# Patient Record
Sex: FEMALE | Race: WHITE | NOT HISPANIC OR LATINO | Employment: STUDENT | URBAN - METROPOLITAN AREA
[De-identification: names, ages, dates, MRNs, and addresses within clinical notes are randomized per-mention and may not be internally consistent; named-entity substitution may affect disease eponyms.]

---

## 2021-06-07 ENCOUNTER — TELEPHONE (OUTPATIENT)
Dept: RHEUMATOLOGY | Facility: CLINIC | Age: 21
End: 2021-06-07

## 2021-06-08 ENCOUNTER — OFFICE VISIT (OUTPATIENT)
Dept: RHEUMATOLOGY | Facility: CLINIC | Age: 21
End: 2021-06-08
Payer: COMMERCIAL

## 2021-06-08 ENCOUNTER — LAB VISIT (OUTPATIENT)
Dept: LAB | Facility: HOSPITAL | Age: 21
End: 2021-06-08
Attending: INTERNAL MEDICINE
Payer: COMMERCIAL

## 2021-06-08 VITALS
DIASTOLIC BLOOD PRESSURE: 74 MMHG | BODY MASS INDEX: 21.57 KG/M2 | WEIGHT: 125.69 LBS | HEART RATE: 70 BPM | SYSTOLIC BLOOD PRESSURE: 115 MMHG

## 2021-06-08 DIAGNOSIS — Z71.89 COUNSELING ON HEALTH PROMOTION AND DISEASE PREVENTION: ICD-10-CM

## 2021-06-08 DIAGNOSIS — M25.50 ARTHRALGIA, UNSPECIFIED JOINT: ICD-10-CM

## 2021-06-08 DIAGNOSIS — M25.50 ARTHRALGIA, UNSPECIFIED JOINT: Primary | ICD-10-CM

## 2021-06-08 DIAGNOSIS — Z84.0 FAMILY HISTORY OF LUPUS ERYTHEMATOSUS: ICD-10-CM

## 2021-06-08 DIAGNOSIS — Z83.2 FAMILY HISTORY OF AUTOIMMUNE DISORDER: ICD-10-CM

## 2021-06-08 DIAGNOSIS — M79.10 MYALGIA: ICD-10-CM

## 2021-06-08 PROCEDURE — 86703 HIV-1/HIV-2 1 RESULT ANTBDY: CPT | Performed by: INTERNAL MEDICINE

## 2021-06-08 PROCEDURE — 99999 PR PBB SHADOW E&M-EST. PATIENT-LVL III: CPT | Mod: PBBFAC,,, | Performed by: INTERNAL MEDICINE

## 2021-06-08 PROCEDURE — 80074 ACUTE HEPATITIS PANEL: CPT | Performed by: INTERNAL MEDICINE

## 2021-06-08 PROCEDURE — 99999 PR PBB SHADOW E&M-EST. PATIENT-LVL III: ICD-10-PCS | Mod: PBBFAC,,, | Performed by: INTERNAL MEDICINE

## 2021-06-08 PROCEDURE — 86038 ANTINUCLEAR ANTIBODIES: CPT | Performed by: INTERNAL MEDICINE

## 2021-06-08 PROCEDURE — 86200 CCP ANTIBODY: CPT | Performed by: INTERNAL MEDICINE

## 2021-06-08 PROCEDURE — 3008F BODY MASS INDEX DOCD: CPT | Mod: CPTII,S$GLB,, | Performed by: INTERNAL MEDICINE

## 2021-06-08 PROCEDURE — 36415 COLL VENOUS BLD VENIPUNCTURE: CPT | Mod: PO | Performed by: INTERNAL MEDICINE

## 2021-06-08 PROCEDURE — 1126F AMNT PAIN NOTED NONE PRSNT: CPT | Mod: S$GLB,,, | Performed by: INTERNAL MEDICINE

## 2021-06-08 PROCEDURE — 99205 OFFICE O/P NEW HI 60 MIN: CPT | Mod: S$GLB,,, | Performed by: INTERNAL MEDICINE

## 2021-06-08 PROCEDURE — 3008F PR BODY MASS INDEX (BMI) DOCUMENTED: ICD-10-PCS | Mod: CPTII,S$GLB,, | Performed by: INTERNAL MEDICINE

## 2021-06-08 PROCEDURE — 99205 PR OFFICE/OUTPT VISIT, NEW, LEVL V, 60-74 MIN: ICD-10-PCS | Mod: S$GLB,,, | Performed by: INTERNAL MEDICINE

## 2021-06-08 PROCEDURE — 1126F PR PAIN SEVERITY QUANTIFIED, NO PAIN PRESENT: ICD-10-PCS | Mod: S$GLB,,, | Performed by: INTERNAL MEDICINE

## 2021-06-08 RX ORDER — NABUMETONE 500 MG/1
500 TABLET, FILM COATED ORAL 2 TIMES DAILY
Qty: 60 TABLET | Refills: 0 | Status: SHIPPED | OUTPATIENT
Start: 2021-06-08 | End: 2021-06-22

## 2021-06-09 ENCOUNTER — PATIENT MESSAGE (OUTPATIENT)
Dept: RHEUMATOLOGY | Facility: CLINIC | Age: 21
End: 2021-06-09

## 2021-06-09 LAB
ANA SER QL IF: NORMAL
CCP AB SER IA-ACNC: <0.5 U/ML
HAV IGM SERPL QL IA: NEGATIVE
HBV CORE IGM SERPL QL IA: NEGATIVE
HBV SURFACE AG SERPL QL IA: NEGATIVE
HCV AB SERPL QL IA: NEGATIVE
HIV 1+2 AB+HIV1 P24 AG SERPL QL IA: NEGATIVE

## 2021-06-15 ENCOUNTER — TELEPHONE (OUTPATIENT)
Dept: RHEUMATOLOGY | Facility: CLINIC | Age: 21
End: 2021-06-15

## 2021-06-15 DIAGNOSIS — R21 RASH AND NONSPECIFIC SKIN ERUPTION: Primary | ICD-10-CM

## 2022-05-01 ENCOUNTER — NURSE TRIAGE (OUTPATIENT)
Dept: ADMINISTRATIVE | Facility: CLINIC | Age: 22
End: 2022-05-01
Payer: COMMERCIAL

## 2022-05-01 ENCOUNTER — PATIENT MESSAGE (OUTPATIENT)
Dept: RHEUMATOLOGY | Facility: CLINIC | Age: 22
End: 2022-05-01
Payer: COMMERCIAL

## 2022-05-01 NOTE — TELEPHONE ENCOUNTER
Reason for Disposition   [1] Very swollen joint AND [2] no fever    Additional Information   Negative: Sounds like a life-threatening emergency to the triager   Negative: Followed a knee injury   Negative: Leg pain is main symptom   Negative: Knee swelling is main symptom   Negative: [1] Swollen joint AND [2] fever   Negative: [1] Red area or streak AND [2] fever   Negative: Patient sounds very sick or weak to the triager   Negative: [1] SEVERE pain (e.g., excruciating, unable to walk) AND [2] not improved after 2 hours of pain medicine   Negative: [1] Can't move swollen joint at all AND [2] no fever   Negative: [1] Thigh or calf pain AND [2] only 1 side AND [3] present > 1 hour   Negative: [1] Thigh, calf, or ankle swelling AND [2] only 1 side   Negative: [1] Looks infected (spreading redness, pus) AND [2] large red area (> 2 in. or 5 cm)    Protocols used: KNEE PAIN-A-    Pt is on the phone with her Mother. Pt's mother stated the pt has joint pain and rashes on her elbows that comes and goes for two years. Stated Dr. Monroe could not determine the problem the last time he saw her.    Pt's mother stated she needs a referral to a rheumatologist immediately in [facility] while the pt is having the attack.     Stated she saw Dr. Monroe a year ago. Per triage protocol, pt advised to see a MD in 24 hrs or go to Urgent Care today, and a message will be sent to Dr. Monroe for follow up when the office opens.     Pt's mother adamantly refused the disposition and demanded to speak with the Provider on call for a referral today. Call placed to krupa Leavitt transferred the call to Pt's mother.

## 2022-05-02 DIAGNOSIS — M25.50 ARTHRALGIA, UNSPECIFIED JOINT: Primary | ICD-10-CM

## 2022-05-02 DIAGNOSIS — Z83.2 FAMILY HISTORY OF AUTOIMMUNE DISORDER: ICD-10-CM

## 2022-05-02 RX ORDER — METHYLPREDNISOLONE 4 MG/1
TABLET ORAL
Qty: 1 EACH | Refills: 0 | Status: SHIPPED | OUTPATIENT
Start: 2022-05-02 | End: 2024-02-26 | Stop reason: ALTCHOICE

## 2022-05-02 NOTE — PROGRESS NOTES
I spoke to the patient.  I would like her to get CBC, CMP, sed rate, CRP.  I will ask staff to help coordinate that with her.  She lives and rest in.  Obviously they can have that done in the area and get the labs faxed to us.  Also, I would like her to see Dermatology for the rash on the right elbow.  She may benefit from having the rash biopsied.  Patient will be moving to [place] in the next 2 months.  She is requesting referral for Rheumatology.  She will touch base with insurance to find a rheumatologist in the area.  Once she provides that we will certainly send the fax over so she can schedule as soon as possible.  In the meantime I put her on a Medrol Dosepak.  She will hold the nabumetone while taking the Medrol Dosepak.  She verbalized understanding and agreed with the above.

## 2022-05-03 ENCOUNTER — TELEPHONE (OUTPATIENT)
Dept: RHEUMATOLOGY | Facility: CLINIC | Age: 22
End: 2022-05-03
Payer: COMMERCIAL

## 2022-05-03 DIAGNOSIS — Z83.2 FAMILY HISTORY OF AUTOIMMUNE DISORDER: ICD-10-CM

## 2022-05-03 DIAGNOSIS — M25.50 ARTHRALGIA, UNSPECIFIED JOINT: Primary | ICD-10-CM

## 2022-05-03 DIAGNOSIS — R21 RASH AND NONSPECIFIC SKIN ERUPTION: ICD-10-CM

## 2022-05-03 NOTE — TELEPHONE ENCOUNTER
----- Message from Ana Yusuf PA-C sent at 5/3/2022  8:34 AM CDT -----  Hey, I talked to Dr. Monroe yesterday and he provided some guidance.  She needs BW (can get arranged today locally unless we have a lab nearby).  And I sent in a steroid for her.   She said she'd call insurance to let us know where to send the referral.    ----- Message -----  From: Clifford Brown LPN  Sent: 5/3/2022   8:30 AM CDT  To: Ana Yusuf PA-C    Does she need a virtual visit with us or someone in [facility]?   ----- Message -----  From: Ana Yusuf PA-C  Sent: 5/2/2022  10:54 AM CDT  To: NATALI Blevins  Got this call on Sunday about Ambyr.  Meant to cc  you but apparently did not.  Can you get her a VV w someone this week?  Fer saw her and basically said he would need to see her during a flare.  Even with a ref to someone in [facility], I doubt she will get in this week.    ----- Message -----  From: Ana Yusuf PA-C  Sent: 5/1/2022   8:27 AM CDT  To: Charlie Monroe MD, Ana Yusuf PA-C    Dull achy joint pain and rash on the backs of the elbows.  Started last day or two. Nabumetone twice daily.  You had mentioned needing her to be seen when she was having an attach. Now living in [facility] and looking for referral to someone there.     I think shed benefit from at least getting a VV w one of us now that she is symptomatic. She will also send you a pic of the rash. Clifford, can you help get her scheduled?  Do you have anyone in mind in the [facility] area for referral?

## 2022-05-04 ENCOUNTER — TELEPHONE (OUTPATIENT)
Dept: RHEUMATOLOGY | Facility: CLINIC | Age: 22
End: 2022-05-04
Payer: COMMERCIAL

## 2022-05-04 NOTE — TELEPHONE ENCOUNTER
----- Message from Leigh Ann Villafuerte LPN sent at 5/3/2022  4:51 PM CDT -----  Contact: pt    ----- Message -----  From: Clinton Laird  Sent: 5/3/2022   8:23 AM CDT  To: Fer Guerra Staff    Pt is calling rg following up on phone call from yesterday. Has some questions rg the labs she is to have done and can be reached at [phone] and pls call anytime after 12 //thanks/dbw

## 2022-05-04 NOTE — TELEPHONE ENCOUNTER
"Returned patients phone call. All questions answered.      Diane Lamar" QUIN Matos  Rheumatology Department   "

## 2022-05-06 DIAGNOSIS — R21 RASH AND NONSPECIFIC SKIN ERUPTION: Primary | ICD-10-CM

## 2022-05-09 ENCOUNTER — PATIENT MESSAGE (OUTPATIENT)
Dept: RHEUMATOLOGY | Facility: CLINIC | Age: 22
End: 2022-05-09
Payer: COMMERCIAL

## 2022-05-10 ENCOUNTER — TELEPHONE (OUTPATIENT)
Dept: RHEUMATOLOGY | Facility: CLINIC | Age: 22
End: 2022-05-10
Payer: COMMERCIAL

## 2022-05-10 NOTE — TELEPHONE ENCOUNTER
----- Message from Dieter Johnson sent at 5/10/2022 11:48 AM CDT -----  Contact: PT  Type:  Patient Returning Call    Who Called:PT  Who Left Message for Patient:[place]   Does the patient know what this is regarding?: Faxes  Would the patient rather a call back or a response via [facility]? Call back   Best Call Back Number: [phone]  Additional Information: Might not be able to answer. Got a call Friday about her results

## 2022-05-10 NOTE — TELEPHONE ENCOUNTER
Patient  Is requesting her labs be scanned in chart so see can view them . Patient states that she was talking with Ms Yusuf's nurse on Friday 5-6 and labs were being reviewed

## 2022-05-10 NOTE — TELEPHONE ENCOUNTER
----- Message from Leigh Ann Villafuerte LPN sent at 5/10/2022  8:19 AM CDT -----  Regarding: do you receive labs for this patient?  Patient looking for faxed labs.    -Dulce

## 2022-05-12 ENCOUNTER — TELEPHONE (OUTPATIENT)
Dept: OBSTETRICS AND GYNECOLOGY | Facility: CLINIC | Age: 22
End: 2022-05-12
Payer: COMMERCIAL

## 2022-05-12 ENCOUNTER — TELEPHONE (OUTPATIENT)
Dept: RHEUMATOLOGY | Facility: CLINIC | Age: 22
End: 2022-05-12
Payer: COMMERCIAL

## 2022-05-12 NOTE — TELEPHONE ENCOUNTER
----- Message from Maria Isabel Chairez sent at 5/11/2022  4:17 PM CDT -----  Pt's mother (Joy) would like the nurse to call her back please. Call back number is .[phone]. Thx. EL

## 2022-05-12 NOTE — TELEPHONE ENCOUNTER
Spoke with mother and they were calling to get the test results (scanned under media) and states that pt saw a dermatologist today and they told her that she has psoriatic arthritis. Advised mother that we would send a message over to the provider regarding the results and once they let us know we will give them a call back. Verbalized understanding

## 2022-05-12 NOTE — TELEPHONE ENCOUNTER
----- Message from Alejandro Valdez sent at 5/12/2022  3:47 PM CDT -----  Contact: Alicia Vargas (mom) would like to consult with nurse regarding derm results.  Please contact  Alicia @ [phone].  Thanks/As

## 2022-05-12 NOTE — TELEPHONE ENCOUNTER
AUTUMN Hernandez, NATALI; Charlie Monroe MD  Caller: Unspecified (Today,  3:56 PM)  Dr. Monroe,   This girl saw you last year for arthralgias.  Saw derm and now has dx  of Psoriasis.  I would assume you would like her to have a visit w you or one of us to discuss treatment moving forward?   Clifford   You can let her know the labs are fine.  But b/f anyone gives her medication for PsA, she needs an appt.  When they called while I was on call, she told me she was moving and wanted to see rheum where she was moving.  Brandin if the new derm dx changed her mind?  But if so, schedule appt w 1st available, unless Dr. Monroe says otherwise.   Destiny

## 2022-05-13 NOTE — TELEPHONE ENCOUNTER
"Are we able to make external lab results visible to the patient?    Diane Lamar" QUIN Matos  Rheumatology Department  "

## 2022-05-14 ENCOUNTER — PATIENT MESSAGE (OUTPATIENT)
Dept: RHEUMATOLOGY | Facility: CLINIC | Age: 22
End: 2022-05-14
Payer: COMMERCIAL

## 2022-05-16 ENCOUNTER — PATIENT MESSAGE (OUTPATIENT)
Dept: RHEUMATOLOGY | Facility: CLINIC | Age: 22
End: 2022-05-16
Payer: COMMERCIAL

## 2024-02-26 PROBLEM — Z00.00 ANNUAL PHYSICAL EXAM: Status: ACTIVE | Noted: 2024-02-26

## 2024-02-26 PROBLEM — E55.9 VITAMIN D DEFICIENCY: Status: ACTIVE | Noted: 2024-02-26

## 2024-02-26 PROBLEM — F33.41 RECURRENT MAJOR DEPRESSIVE DISORDER, IN PARTIAL REMISSION: Status: ACTIVE | Noted: 2024-02-26

## 2024-02-26 PROBLEM — F41.9 ANXIETY AND DEPRESSION: Status: ACTIVE | Noted: 2024-02-26

## 2024-02-26 PROBLEM — F32.A ANXIETY AND DEPRESSION: Status: ACTIVE | Noted: 2024-02-26

## 2024-05-27 PROBLEM — Z00.00 ANNUAL PHYSICAL EXAM: Status: RESOLVED | Noted: 2024-02-26 | Resolved: 2024-05-27

## 2025-06-09 ENCOUNTER — OFFICE VISIT (OUTPATIENT)
Dept: GASTROENTEROLOGY | Facility: CLINIC | Age: 25
End: 2025-06-09
Payer: COMMERCIAL

## 2025-06-09 VITALS — HEIGHT: 65 IN | BODY MASS INDEX: 19.16 KG/M2 | WEIGHT: 115 LBS

## 2025-06-09 DIAGNOSIS — Z86.59 HISTORY OF BULIMIA NERVOSA: ICD-10-CM

## 2025-06-09 DIAGNOSIS — K63.9 THICKENED SMALL BOWEL: ICD-10-CM

## 2025-06-09 DIAGNOSIS — R10.84 GENERALIZED ABDOMINAL PAIN: Primary | ICD-10-CM

## 2025-06-09 DIAGNOSIS — R19.8 ALTERNATING CONSTIPATION AND DIARRHEA: ICD-10-CM

## 2025-06-09 DIAGNOSIS — K21.9 GASTROESOPHAGEAL REFLUX DISEASE WITHOUT ESOPHAGITIS: ICD-10-CM

## 2025-06-09 DIAGNOSIS — R19.8 IRREGULAR BOWEL HABITS: ICD-10-CM

## 2025-06-09 DIAGNOSIS — R93.3 ABNORMAL CT SCAN, ESOPHAGUS: ICD-10-CM

## 2025-06-09 PROCEDURE — 1159F MED LIST DOCD IN RCRD: CPT | Mod: CPTII,S$GLB,, | Performed by: NURSE PRACTITIONER

## 2025-06-09 PROCEDURE — 3008F BODY MASS INDEX DOCD: CPT | Mod: CPTII,S$GLB,, | Performed by: NURSE PRACTITIONER

## 2025-06-09 PROCEDURE — 99999 PR PBB SHADOW E&M-EST. PATIENT-LVL IV: CPT | Mod: PBBFAC,,, | Performed by: NURSE PRACTITIONER

## 2025-06-09 PROCEDURE — 1160F RVW MEDS BY RX/DR IN RCRD: CPT | Mod: CPTII,S$GLB,, | Performed by: NURSE PRACTITIONER

## 2025-06-09 PROCEDURE — 99204 OFFICE O/P NEW MOD 45 MIN: CPT | Mod: S$GLB,,, | Performed by: NURSE PRACTITIONER

## 2025-06-09 RX ORDER — OMEPRAZOLE 40 MG/1
40 CAPSULE, DELAYED RELEASE ORAL DAILY
Qty: 30 CAPSULE | Refills: 2 | Status: SHIPPED | OUTPATIENT
Start: 2025-06-09 | End: 2025-09-07

## 2025-06-09 RX ORDER — MULTIVITAMIN
1 TABLET ORAL EVERY MORNING
COMMUNITY

## 2025-06-09 RX ORDER — B-COMPLEX WITH VITAMIN C
1 TABLET ORAL EVERY MORNING
COMMUNITY

## 2025-06-09 NOTE — PROGRESS NOTES
"Subjective:       Patient ID: Rocio Booker is a 25 y.o. female, Body mass index is 19.14 kg/m².    Chief Complaint: Establish Care      Patient is new to me. Referred by Siva Suero PA-C for epigastric pain and enteritis.    Reviewed ED discharge summary from 6/2/25: "Hx/PE/MDM:  Patient is a 25-year-old female with a PMH of anxiety, depression, parotitis presenting to the ED today due to concern for myalgias, fever and epigastric abdominal pain.     On exam, patient has epigastric abdominal tenderness to palpation.  No rebound or guarding.  No signs of meningismus.  Patient ambulatory without instability.  Patient febrile upon arrival.     Blood work notable for magnesium 1.5, sodium 130, potassium 2.9.  CT concerning for possible esophagitis or enteritis.  Potassium and magnesium repleted in the ED.  Patient provided with IV fluids and Toradol.  No further emergent workup or intervention required while in the ED.  Patient stable for discharge home."    Abdominal Pain  This is a new problem. The current episode started 1 to 4 weeks ago. The onset quality is sudden. The problem occurs constantly. The problem has been rapidly improving. The pain is located in the generalized abdominal region. The pain is severe. The quality of the pain is aching. The abdominal pain does not radiate. Associated symptoms include anorexia, belching (taking OTC Gas-X PRN helps), constipation (chronic problem; alternates between constipation and diarrhea; denies diarrhea currently), diarrhea, a fever (at onset of symptoms; denies currently), flatus and nausea. Pertinent negatives include no dysuria, hematochezia, melena, vomiting or weight loss. Associated symptoms comments: Associated symptoms also include abdominal bloating. The pain is aggravated by palpation. The pain is relieved by Nothing. She has tried nothing for the symptoms. Prior diagnostic workup includes GI consult and CT scan (ED visit). Her past medical " history is significant for GERD (Hx of GERD- reports frequent heartburn; taking OTC Tums PRN helps). There is no history of abdominal surgery, colon cancer, Crohn's disease, gallstones, irritable bowel syndrome, pancreatitis, PUD or ulcerative colitis.     Review of Systems   Constitutional:  Positive for chills (at onset of symptoms; denies currently) and fever (at onset of symptoms; denies currently). Negative for appetite change, unexpected weight change and weight loss.   HENT:  Negative for trouble swallowing.    Respiratory:  Negative for cough and shortness of breath.    Cardiovascular:  Negative for chest pain.   Gastrointestinal:  Positive for abdominal distention, abdominal pain, anorexia, constipation (chronic problem; alternates between constipation and diarrhea; denies diarrhea currently), diarrhea, flatus and nausea. Negative for anal bleeding, blood in stool, hematochezia, melena, rectal pain and vomiting.   Genitourinary:  Negative for difficulty urinating and dysuria.   Musculoskeletal:  Negative for gait problem.   Skin:  Negative for rash.   Neurological:  Negative for speech difficulty.   Psychiatric/Behavioral:  Negative for confusion.        History reviewed. No pertinent past medical history.   Past Surgical History:   Procedure Laterality Date    WISDOM TOOTH EXTRACTION        Family History   Problem Relation Name Age of Onset    Hypertension Mother      Arthritis Father      Kidney disease Maternal Grandmother      Arthritis Maternal Grandfather      Diabetes Paternal Grandmother      Drug abuse Paternal Grandmother      Hypertension Paternal Grandfather      Colon cancer Neg Hx      Esophageal cancer Neg Hx      Stomach cancer Neg Hx        Wt Readings from Last 10 Encounters:   06/09/25 52.2 kg (115 lb)   06/01/25 54.7 kg (120 lb 9.5 oz)   10/26/24 52.2 kg (115 lb)   02/26/24 50.8 kg (112 lb)   06/08/21 57 kg (125 lb 10.6 oz)   06/03/21 57.9 kg (127 lb 11.2 oz)   08/29/17 56.8 kg (125 lb  3.5 oz) (56%, Z= 0.14)*   05/12/17 56.7 kg (125 lb) (57%, Z= 0.17)*   04/25/17 57 kg (125 lb 10.6 oz) (58%, Z= 0.20)*   04/29/16 73.1 kg (161 lb 1.6 oz) (92%, Z= 1.43)*     * Growth percentiles are based on [facility] (Girls, 2-20 Years) data.     Lab Results   Component Value Date    WBC 6.00 06/01/2025    HGB 14.3 06/01/2025    HCT 39.9 06/01/2025    MCV 92 06/01/2025     06/01/2025     CMP  Sodium   Date Value Ref Range Status   06/01/2025 130 (L) 136 - 145 mmol/L Final     Potassium   Date Value Ref Range Status   06/01/2025 2.9 (L) 3.5 - 5.1 mmol/L Final     Comment:     Anion Gap reference range revised on 4/28/2023     Chloride   Date Value Ref Range Status   06/01/2025 89 (L) 95 - 110 mmol/L Final     CO2   Date Value Ref Range Status   06/01/2025 29 23 - 29 mmol/L Final     Glucose   Date Value Ref Range Status   06/01/2025 123 (H) 70 - 110 mg/dL Final     Comment:     The ADA recommends the following guidelines for fasting glucose:    Normal:       less than 100 mg/dL    Prediabetes:  100 mg/dL to 125 mg/dL    Diabetes:     126 mg/dL or higher       BUN   Date Value Ref Range Status   06/01/2025 15 6 - 20 mg/dL Final     Creatinine   Date Value Ref Range Status   06/01/2025 0.74 0.50 - 1.40 mg/dL Final     Calcium   Date Value Ref Range Status   06/01/2025 9.3 8.7 - 10.5 mg/dL Final     Total Protein   Date Value Ref Range Status   06/01/2025 6.9 6.0 - 8.4 g/dL Final     Albumin   Date Value Ref Range Status   06/01/2025 4.3 3.5 - 5.2 g/dL Final     Total Bilirubin   Date Value Ref Range Status   06/01/2025 0.6 0.2 - 1.0 mg/dL Final     Alkaline Phosphatase   Date Value Ref Range Status   06/01/2025 43 40 - 150 U/L Final     AST ([place])   Date Value Ref Range Status   12/04/2015 19 14 - 36 U/L Final     AST   Date Value Ref Range Status   06/01/2025 32 10 - 40 U/L Final     ALT   Date Value Ref Range Status   06/01/2025 22 10 - 44 U/L Final     Anion Gap   Date Value Ref Range Status   06/01/2025  12 8 - 16 mmol/L Final     Comment:     Anion Gap reference range revised on 4/28/2023     eGFR if    Date Value Ref Range Status   06/04/2021 >60 >60 mL/min/1.73 m^2 Final     eGFR    Date Value Ref Range Status   02/05/2025 85 mL/min/1.73mSq Final     Comment:     In accordance with NKF-ASN Task Force recommendation, calculation based on the Chronic Kidney Disease Epidemiology Collaboration (CKD-EPI) equation without adjustment for race. eGFR adjusted for gender and age and calculated in ml/min/1.73mSquared. eGFR cannot be calculated if patient is under 18 years of age.     Reference Range:   >= 60 ml/min/1.73mSquared.     eGFR if non    Date Value Ref Range Status   06/04/2021 >60 >60 mL/min/1.73 m^2 Final     Comment:     Calculation used to obtain the estimated glomerular filtration  rate (eGFR) is the CKD-EPI equation.        Lab Results   Component Value Date    AMYLASE 67 12/01/2015     Lab Results   Component Value Date    LIPASE 59 12/01/2015     Lab Results   Component Value Date    LIPASERES 23 06/01/2025             Reviewed prior medical records including radiology report of CT of abdomen and pelvis 6/2/25 & endoscopy history (see surgical history).     Objective:      Physical Exam  Constitutional:       General: She is not in acute distress.     Appearance: She is well-developed.   HENT:      Head: Normocephalic.      Right Ear: Hearing normal.      Left Ear: Hearing normal.      Nose: Nose normal.      Mouth/Throat:      Mouth: No oral lesions.      Pharynx: Uvula midline.   Eyes:      General: Lids are normal.      Conjunctiva/sclera: Conjunctivae normal.      Pupils: Pupils are equal, round, and reactive to light.   Neck:      Trachea: Trachea normal.   Cardiovascular:      Rate and Rhythm: Normal rate and regular rhythm.      Heart sounds: Normal heart sounds. No murmur heard.  Pulmonary:      Effort: Pulmonary effort is normal. No respiratory  distress.      Breath sounds: Normal breath sounds. No stridor. No wheezing.   Abdominal:      General: Bowel sounds are normal. There is no distension.      Palpations: Abdomen is soft. There is no mass.      Tenderness: There is abdominal tenderness (mild) in the left upper quadrant and left lower quadrant. There is no guarding or rebound.   Musculoskeletal:         General: Normal range of motion.      Cervical back: Normal range of motion.   Skin:     General: Skin is warm and dry.      Findings: No rash.      Comments: Non jaundiced   Neurological:      Mental Status: She is alert and oriented to person, place, and time.   Psychiatric:         Speech: Speech normal.         Behavior: Behavior normal. Behavior is cooperative.           Assessment:       1. Generalized abdominal pain    2. Abnormal CT scan, esophagus    3. Thickened small bowel    4. Gastroesophageal reflux disease without esophagitis    5. History of bulimia nervosa    6. Alternating constipation and diarrhea    7. Irregular bowel habits           Plan:   All diagnoses and orders for this visit:    Generalized abdominal pain, Abnormal CT scan, esophagus, Thickened small bowel, Gastroesophageal reflux disease without esophagitis & History of bulimia nervosa  - Schedule EGD   - Start: omeprazole (PRILOSEC) 40 MG capsule; Take 1 capsule (40 mg total) by mouth once daily.  Dispense: 30 capsule; Refill: 2  - Take PPI in the morning 30 minutes before breakfast  - Recommend to avoid large meals, avoid eating within 3 hours of bedtime, elevate head of bed if nocturnal symptoms are present, smoking cessation (if current smoker), & weight loss (if overweight).   - Recommend minimize/avoid high-fat foods, chocolate, caffeine, citrus, alcohol, & tomato products.  - Advised to avoid/limit use of NSAID's, since they can cause GI upset, bleeding, and/or ulcers. If needed, take with food.      Alternating constipation and diarrhea & Irregular bowel habits   -  Recommend daily exercise as tolerated, adequate water intake, and high fiber diet.   - Recommend OTC fiber supplement (Benefiber)  - Recommended colonoscopy but patient would like to hold off at this time     If no improvement in symptoms or symptoms worsen, call/follow-up at clinic or go to ER

## 2025-07-07 ENCOUNTER — ANESTHESIA (OUTPATIENT)
Dept: ENDOSCOPY | Facility: HOSPITAL | Age: 25
End: 2025-07-07
Payer: COMMERCIAL

## 2025-07-07 ENCOUNTER — HOSPITAL ENCOUNTER (OUTPATIENT)
Facility: HOSPITAL | Age: 25
Discharge: HOME | End: 2025-07-07
Attending: STUDENT IN AN ORGANIZED HEALTH CARE EDUCATION/TRAINING PROGRAM | Admitting: STUDENT IN AN ORGANIZED HEALTH CARE EDUCATION/TRAINING PROGRAM
Payer: COMMERCIAL

## 2025-07-07 ENCOUNTER — TELEPHONE (OUTPATIENT)
Dept: GASTROENTEROLOGY | Facility: CLINIC | Age: 25
End: 2025-07-07
Payer: COMMERCIAL

## 2025-07-07 ENCOUNTER — ANESTHESIA EVENT (OUTPATIENT)
Dept: ENDOSCOPY | Facility: HOSPITAL | Age: 25
End: 2025-07-07
Payer: COMMERCIAL

## 2025-07-07 VITALS
BODY MASS INDEX: 19.16 KG/M2 | SYSTOLIC BLOOD PRESSURE: 105 MMHG | WEIGHT: 115 LBS | HEIGHT: 65 IN | RESPIRATION RATE: 16 BRPM | DIASTOLIC BLOOD PRESSURE: 51 MMHG | OXYGEN SATURATION: 100 % | TEMPERATURE: 97 F | HEART RATE: 55 BPM

## 2025-07-07 DIAGNOSIS — K21.9 GASTROESOPHAGEAL REFLUX DISEASE, UNSPECIFIED WHETHER ESOPHAGITIS PRESENT: ICD-10-CM

## 2025-07-07 DIAGNOSIS — R10.9 ABDOMINAL PAIN, UNSPECIFIED ABDOMINAL LOCATION: ICD-10-CM

## 2025-07-07 DIAGNOSIS — R93.89 ABNORMAL CT SCAN: ICD-10-CM

## 2025-07-07 DIAGNOSIS — R10.9 ABDOMINAL PAIN: ICD-10-CM

## 2025-07-07 LAB
B-HCG UR QL: NEGATIVE
CTP QC/QA: YES

## 2025-07-07 PROCEDURE — 63600175 PHARM REV CODE 636 W HCPCS: Mod: PO | Performed by: STUDENT IN AN ORGANIZED HEALTH CARE EDUCATION/TRAINING PROGRAM

## 2025-07-07 PROCEDURE — 37000009 HC ANESTHESIA EA ADD 15 MINS: Mod: PO | Performed by: STUDENT IN AN ORGANIZED HEALTH CARE EDUCATION/TRAINING PROGRAM

## 2025-07-07 PROCEDURE — 88305 TISSUE EXAM BY PATHOLOGIST: CPT | Mod: TC | Performed by: STUDENT IN AN ORGANIZED HEALTH CARE EDUCATION/TRAINING PROGRAM

## 2025-07-07 PROCEDURE — 25000003 PHARM REV CODE 250: Mod: PO | Performed by: NURSE ANESTHETIST, CERTIFIED REGISTERED

## 2025-07-07 PROCEDURE — 63600175 PHARM REV CODE 636 W HCPCS: Mod: PO | Performed by: NURSE ANESTHETIST, CERTIFIED REGISTERED

## 2025-07-07 PROCEDURE — 37000008 HC ANESTHESIA 1ST 15 MINUTES: Mod: PO | Performed by: STUDENT IN AN ORGANIZED HEALTH CARE EDUCATION/TRAINING PROGRAM

## 2025-07-07 PROCEDURE — 81025 URINE PREGNANCY TEST: CPT | Mod: PO | Performed by: STUDENT IN AN ORGANIZED HEALTH CARE EDUCATION/TRAINING PROGRAM

## 2025-07-07 PROCEDURE — 27201012 HC FORCEPS, HOT/COLD, DISP: Mod: PO | Performed by: STUDENT IN AN ORGANIZED HEALTH CARE EDUCATION/TRAINING PROGRAM

## 2025-07-07 PROCEDURE — 43239 EGD BIOPSY SINGLE/MULTIPLE: CPT | Mod: PO | Performed by: STUDENT IN AN ORGANIZED HEALTH CARE EDUCATION/TRAINING PROGRAM

## 2025-07-07 PROCEDURE — 43239 EGD BIOPSY SINGLE/MULTIPLE: CPT | Mod: ,,, | Performed by: STUDENT IN AN ORGANIZED HEALTH CARE EDUCATION/TRAINING PROGRAM

## 2025-07-07 RX ORDER — SODIUM CHLORIDE 0.9 % (FLUSH) 0.9 %
10 SYRINGE (ML) INJECTION
Status: DISCONTINUED | OUTPATIENT
Start: 2025-07-07 | End: 2025-07-07 | Stop reason: HOSPADM

## 2025-07-07 RX ORDER — PROPOFOL 10 MG/ML
INJECTION, EMULSION INTRAVENOUS
Status: DISCONTINUED | OUTPATIENT
Start: 2025-07-07 | End: 2025-07-07

## 2025-07-07 RX ORDER — LIDOCAINE HYDROCHLORIDE 10 MG/ML
INJECTION, SOLUTION EPIDURAL; INFILTRATION; INTRACAUDAL; PERINEURAL
Status: DISCONTINUED | OUTPATIENT
Start: 2025-07-07 | End: 2025-07-07

## 2025-07-07 RX ORDER — VONOPRAZAN FUMARATE 26.72 MG/1
20 TABLET ORAL DAILY
Qty: 60 TABLET | Refills: 0 | Status: SHIPPED | OUTPATIENT
Start: 2025-07-07 | End: 2025-09-05

## 2025-07-07 RX ORDER — SODIUM CHLORIDE, SODIUM LACTATE, POTASSIUM CHLORIDE, CALCIUM CHLORIDE 600; 310; 30; 20 MG/100ML; MG/100ML; MG/100ML; MG/100ML
INJECTION, SOLUTION INTRAVENOUS CONTINUOUS
Status: DISCONTINUED | OUTPATIENT
Start: 2025-07-07 | End: 2025-07-07 | Stop reason: HOSPADM

## 2025-07-07 RX ADMIN — SODIUM CHLORIDE: 9 INJECTION, SOLUTION INTRAVENOUS at 12:07

## 2025-07-07 RX ADMIN — PROPOFOL 50 MG: 10 INJECTION, EMULSION INTRAVENOUS at 12:07

## 2025-07-07 RX ADMIN — LIDOCAINE HYDROCHLORIDE 100 MG: 10 SOLUTION INTRAVENOUS at 12:07

## 2025-07-07 RX ADMIN — SODIUM CHLORIDE, POTASSIUM CHLORIDE, SODIUM LACTATE AND CALCIUM CHLORIDE: 600; 310; 30; 20 INJECTION, SOLUTION INTRAVENOUS at 12:07

## 2025-07-07 RX ADMIN — PROPOFOL 150 MG: 10 INJECTION, EMULSION INTRAVENOUS at 12:07

## 2025-07-07 NOTE — ANESTHESIA PREPROCEDURE EVALUATION
07/07/2025  Rocio Booker is a 25 y.o., female.      Pre-op Assessment    I have reviewed the Patient Summary Reports.     I have reviewed the Nursing Notes. I have reviewed the NPO Status.   I have reviewed the Medications.     Review of Systems  Anesthesia Hx:  No problems with previous Anesthesia                Social:  Non-Smoker       Cardiovascular:  Cardiovascular Normal                                              Pulmonary:  Pulmonary Normal                       Renal/:  Renal/ Normal                 Neurological:  Neurology Normal                                      Endocrine:  Endocrine Normal            Psych:  Psychiatric History anxiety depression              Physical Exam  General: Well nourished, Cooperative, Alert and Oriented    Airway:  Mallampati: II   Mouth Opening: Normal  TM Distance: Normal  Neck ROM: Normal ROM    Anesthesia Plan  Type of Anesthesia, risks & benefits discussed:    Anesthesia Type: Gen ETT, Gen Supraglottic Airway, Gen Natural Airway, MAC  Intra-op Monitoring Plan: Standard ASA Monitors  Post Op Pain Control Plan: multimodal analgesia  Induction:  IV  Airway Plan: Direct, Video and Fiberoptic, Post-Induction  Informed Consent: Informed consent signed with the Patient and all parties understand the risks and agree with anesthesia plan.  All questions answered.   ASA Score: 2    Ready For Surgery From Anesthesia Perspective.   .

## 2025-07-07 NOTE — PROVATION PATIENT INSTRUCTIONS
Discharge Summary/Instructions after an Endoscopic Procedure  Patient Name: Rocio Booker  Patient MRN: [id number]  Patient YOB: 2000 Monday, July 7, 2025  Ruperto Clancy DO  Dear patient,  As a result of recent federal legislation (The Federal Cures Act), you may   receive lab or pathology results from your procedure in your [facility]   account before your physician is able to contact you. Your physician or   their representative will relay the results to you with their   recommendations at their soonest availability.  Thank you,  RESTRICTIONS:  During your procedure today, you received medications for sedation.  These   medications may affect your judgment, balance and coordination.  Therefore,   for 24 hours, you have the following restrictions:   - DO NOT drive a car, operate machinery, make legal/financial decisions,   sign important papers or drink alcohol.    ACTIVITY:  Today: no heavy lifting, straining or running due to procedural   sedation/anesthesia.  The following day: return to full activity including work.  DIET:  Eat and drink normally unless instructed otherwise.     TREATMENT FOR COMMON SIDE EFFECTS:  - Mild abdominal pain, nausea, belching, bloating or excessive gas:  rest,   eat lightly and use a heating pad.  - Sore Throat: treat with throat lozenges and/or gargle with warm salt   water.  - Because air was used during the procedure, expelling large amounts of air   from your rectum or belching is normal.  - If a bowel prep was taken, you may not have a bowel movement for 1-3 days.    This is normal.  SYMPTOMS TO WATCH FOR AND REPORT TO YOUR PHYSICIAN:  1. Abdominal pain or bloating, other than gas cramps.  2. Chest pain.  3. Back pain.  4. Signs of infection such as: chills or fever occurring within 24 hours   after the procedure.  5. Rectal bleeding, which would show as bright red, maroon, or black stools.   (A tablespoon of blood from the rectum is not serious, especially  if   hemorrhoids are present.)  6. Vomiting.  7. Weakness or dizziness.  GO DIRECTLY TO THE NEAREST EMERGENCY ROOM IF YOU HAVE ANY OF THE FOLLOWING:      Difficulty breathing              Chills and/or fever over 101 F   Persistent vomiting and/or vomiting blood   Severe abdominal pain   Severe chest pain   Black, tarry stools   Bleeding- more than one tablespoon   Any other symptom or condition that you feel may need urgent attention  Your doctor recommends these additional instructions:  If any biopsies were taken, your doctors clinic will contact you in 1 to 2   weeks with any results.  You have a contact number available for emergencies.  The signs and symptoms   of potential delayed complications were discussed with you.  You may return   to normal activities tomorrow.  Written discharge instructions were   provided to you.   Eat a gastroesophageal reflux disease (GERD) prevention diet.   We are waiting for your pathology results.   Your physician has recommended a repeat upper endoscopy in eight weeks to   check healing.   Return to your nurse practitioner as previously scheduled.   You are being discharged to home.  For questions, problems or results please call your physician - Ruperto Clancy DO at Work:  [phone].  EMERGENCY PHONE NUMBER: [phone], LAB RESULTS: [phone]  IF A COMPLICATION OR EMERGENCY SITUATION ARISES AND YOU ARE UNABLE TO REACH   YOUR PHYSICIAN - GO DIRECTLY TO THE EMERGENCY ROOM.  ___________________________________________  Nurse Signature  ___________________________________________  Patient/Designated Responsible Party Signature  Ruperto Clancy DO  7/7/2025 1:11:16 PM  This report has been verified and signed electronically.  Dear patient,  As a result of recent federal legislation (The Federal Cures Act), you may   receive lab or pathology results from your procedure in your [facility]   account before your physician is able to contact you. Your physician or    their representative will relay the results to you with their   recommendations at their soonest availability.  Thank you.  PROVATION

## 2025-07-07 NOTE — ANESTHESIA POSTPROCEDURE EVALUATION
Anesthesia Post Evaluation    Patient: Rocio Booker    Procedure(s) Performed: Procedure(s) (LRB):  EGD (ESOPHAGOGASTRODUODENOSCOPY) (N/A)    Final Anesthesia Type: general      Patient location during evaluation: PACU  Patient participation: Yes- Able to Participate  Level of consciousness: awake and alert and oriented  Post-procedure vital signs: reviewed and stable  Pain management: adequate  Airway patency: patent    PONV status at discharge: No PONV  Anesthetic complications: no      Cardiovascular status: blood pressure returned to baseline and stable  Respiratory status: unassisted and spontaneous ventilation  Hydration status: euvolemic  Follow-up not needed.              Vitals Value Taken Time   BP 85/50 07/07/25 13:09   Temp 36 °C (96.8 °F) 07/07/25 13:04   Pulse 52 07/07/25 13:09   Resp 18 07/07/25 13:09   SpO2 97 % 07/07/25 13:09         No case tracking events are documented in the log.      Pain/Alcira Score: Alcira Score: 6 (7/7/2025  1:04 PM)

## 2025-07-07 NOTE — H&P
History & Physical - Short Stay  Gastroenterology      SUBJECTIVE:     Procedure: EGD    Chief Complaint/Indication for Procedure: Abdominal Pain    PTA Medications   Medication Sig    B-complex with vitamin C (Z-BEC OR EQUIV) tablet Take 1 tablet by mouth every morning.    levonorgestreL (KYLEENA) 19.5 mg IUD 1 each by Intrauterine route once.    multivitamin with folic acid 400 mcg Tab Take 1 tablet by mouth every morning.    omeprazole (PRILOSEC) 40 MG capsule Take 1 capsule (40 mg total) by mouth once daily.       Review of patient's allergies indicates:  No Known Allergies     History reviewed. No pertinent past medical history.  Past Surgical History:   Procedure Laterality Date    WISDOM TOOTH EXTRACTION       Family History   Problem Relation Name Age of Onset    Hypertension Mother      Arthritis Father      Kidney disease Maternal Grandmother      Arthritis Maternal Grandfather      Diabetes Paternal Grandmother      Drug abuse Paternal Grandmother      Hypertension Paternal Grandfather      Colon cancer Neg Hx      Esophageal cancer Neg Hx      Stomach cancer Neg Hx       Social History[1]      OBJECTIVE:     Vital Signs (Most Recent)  Temp: 97.2 °F (36.2 °C) (07/07/25 1159)  Pulse: (!) 59 (07/07/25 1159)  Resp: 17 (07/07/25 1159)  BP: (!) 92/56 (07/07/25 1159)  SpO2: 100 % (07/07/25 1159)    Physical Exam:                                                       GENERAL:  Comfortable, in no acute distress.                                 HEENT EXAM:  Nonicteric.  No adenopathy.  Oropharynx is clear.               NECK:  Supple.                                                               LUNGS:  Clear.                                                               CARDIAC:  Regular rate and rhythm.  S1, S2.  No murmur.                      ABDOMEN:  Soft, positive bowel sounds, nontender.  No hepatosplenomegaly or masses.  No rebound or guarding.                                             EXTREMITIES:  No  edema.     MENTAL STATUS:  Normal, alert and oriented.      ASSESSMENT/PLAN:     Assessment: Abdominal Pain    Plan: EGD    Anesthesia Plan: General    ASA Grade: ASA 1 - Normal health patient    MALLAMPATI SCORE:  I (soft palate, uvula, fauces, and tonsillar pillars visible)           [1]   Social History  Tobacco Use    Smoking status: Never    Smokeless tobacco: Never   Substance Use Topics    Alcohol use: Yes     Comment: Occasionally    Drug use: No

## 2025-07-07 NOTE — TRANSFER OF CARE
"Anesthesia Transfer of Care Note    Patient: Rocio Booker    Procedure(s) Performed: Procedure(s) (LRB):  EGD (ESOPHAGOGASTRODUODENOSCOPY) (N/A)    Patient location: GI    Anesthesia Type: general    Transport from OR: Transported from OR on room air with adequate spontaneous ventilation    Post pain: adequate analgesia    Post assessment: no apparent anesthetic complications and tolerated procedure well    Post vital signs: stable    Level of consciousness: sedated    Nausea/Vomiting: no nausea/vomiting    Complications: none    Transfer of care protocol was followed      Last vitals: Visit Vitals  BP (!) 92/56 (BP Location: Right arm, Patient Position: Lying)   Pulse (!) 59   Temp 36.2 °C (97.2 °F) (Skin)   Resp 17   Ht 5' 5" (1.651 m)   Wt 52.2 kg (115 lb)   SpO2 100%   BMI 19.14 kg/m²     "

## 2025-07-11 LAB
ESTROGEN SERPL-MCNC: NORMAL PG/ML
INSULIN SERPL-ACNC: NORMAL U[IU]/ML
LAB AP CLINICAL INFORMATION: NORMAL
LAB AP GROSS DESCRIPTION: NORMAL
LAB AP PERFORMING LOCATION(S): NORMAL
LAB AP REPORT FOOTNOTES: NORMAL
T3RU NFR SERPL: NORMAL %

## 2025-08-18 ENCOUNTER — TELEPHONE (OUTPATIENT)
Dept: GASTROENTEROLOGY | Facility: CLINIC | Age: 25
End: 2025-08-18
Payer: COMMERCIAL